# Patient Record
(demographics unavailable — no encounter records)

---

## 2017-03-20 NOTE — DIAGNOSTIC IMAGING REPORT
Indication: SOB



Technique: One view of the chest



Comparison: none



Findings: Lungs and pleural spaces are clear. Heart size is normal.



Impression: No acute process

## 2017-03-20 NOTE — EMERGENCY ROOM REPORT
History of Present Illness


General


Chief Complaint:  Skin Rash/Abscess


Source:  Patient





Present Illness


HPI


Patient is a 22-year-old female who presented after increased right arm 

swelling.  The patient gradual onset of symptoms.  Patient was type II 

diabetic.  She been taking insulin as well as metformin.  Patient was noted to 

have subjective fever.  She been having pain and swelling to the right axillary 

area as well as to the right side of her chest.  She denies recent trauma.


Allergies:  


Coded Allergies:  


     No Known Allergies (Unverified , 2/21/15)





Patient History


Past Medical History:  see triage record, DM


Last Menstrual Period:  3-9


Pregnant Now:  No


Reviewed Nursing Documentation:  PMH: Agreed, PSxH: Agreed





Nursing Documentation-PMH


Hx Diabetes:  Yes - type 1





Review of Systems


All Other Systems:  negative except mentioned in HPI





Physical Exam





Vital Signs








  Date Time  Temp Pulse Resp B/P Pulse Ox O2 Delivery O2 Flow Rate FiO2


 


3/20/17 10:28 98.8 106 18 100/59 98 Room Air  








Sp02 EP Interpretation:  reviewed, normal


General Appearance:  normal inspection, well appearing, alert, GCS 15, mild 

distress, obese


Head:  atraumatic


ENT:  normal ENT inspection, hearing grossly normal, normal voice


Neck:  normal inspection, full range of motion, supple, no bony tend


Respiratory:  normal inspection, lungs clear, normal breath sounds, no 

respiratory distress, no retraction, no wheezing


Cardiovascular #1:  regular rate, rhythm, no edema


Gastrointestinal:  normal inspection, normal bowel sounds, non tender, soft, no 

guarding, no hernia


Genitourinary:  no CVA tenderness


Musculoskeletal:  normal inspection, back normal, normal range of motion


Neurologic:  normal inspection, alert, oriented x3, responsive, CNs III-XII nml 

as tested, speech normal


Psychiatric:  normal inspection, judgement/insight normal, mood/affect normal


Skin:  no rash, other - erythema and without definite abscess to right axillary 

area





Medical Decision Making


Diagnostic Impression:  


 Primary Impression:  


 Cellulitis


ER Course


Patient presented for skin rash.


The differential diagnosis included was not limited to abscess, cellulitis, 

breast cancer, hidradenitis suppurativa among others. The patient was noted to 

have some erythema consistent with a cellulitis.  I don't feel any definite 

abscess.  Patient was given IV fluids as well as IV antibiotics.The patient was 

discussed with Dr. Reilly who agreed except patient in transfer.  Patient was 

noted to have initial lactic acid l which is likely due to patient's metformin 

use.  She does not appear to be septic.. Patient will likely need a ultrasound 

to definitively rule out abscess is an inpatient.





Last Vital Signs








  Date Time  Temp Pulse Resp B/P Pulse Ox O2 Delivery O2 Flow Rate FiO2


 


3/20/17 10:28 98.8 106 18 100/59 98 Room Air  








Status:  unchanged


Disposition:  XFER SHT-[facility]


Condition:  Serious


Referrals:  


[facility]ERR[facility] (PCP)











Xander Mayo Mar 20, 2017 10:57

## 2017-03-21 NOTE — CARDIOLOGY REPORT
--------------- APPROVED REPORT --------------





EKG Measurement

Heart [id number]

OR [id number]

[id number]

[id number]

[id number]





Sinus tachycardia

Otherwise normal ECG

## 2020-07-08 NOTE — EMERGENCY ROOM REPORT
History of Present Illness


General


Chief Complaint:  Skin Rash/Abscess


Source:  Patient





Present Illness


HPI


25-year-old female with history of diabetes presents with right axillary 

abscess for 1 week.  She reports moderate pain.  She has not taken any 

medications but she has applied warm water to the abscess.  She denies any fever

, chills, any other symptoms.


Allergies:  


Coded Allergies:  


     No Known Allergies (Unverified , 2/21/15)





COVID-19 Screening


Contact w/high risk pt:  No


Experienced COVID-19 symptoms?:  No


COVID-19 Testing performed PTA:  No





Patient History


Past Medical History:  see triage record


Last Menstrual Period:  07/02/20


Pregnant Now:  No


Reviewed Nursing Documentation:  PMH: Agreed; PSxH: Agreed





Nursing Documentation-PMH


Past Medical History:  No History, Except For


Hx Diabetes:  Yes - type 1





Review of Systems


Skin:  Reports: see HPI, other - abscess


All Other Systems:  negative except mentioned in HPI





Physical Exam





Vital Signs








  Date Time  Temp Pulse Resp B/P (MAP) Pulse Ox O2 Delivery O2 Flow Rate FiO2


 


7/8/20 12:45 98.8 87 16 115/79 (91) 96 Room Air  








Sp02 EP Interpretation:  reviewed, normal


General Appearance:  normal inspection, well appearing, no apparent distress, 

alert, GCS 15, non-toxic


Neck:  normal inspection, full range of motion, supple, thyroid normal, no 

meningismus, no bony tend


Respiratory:  chest non-tender, lungs clear, normal breath sounds, no 

respiratory distress


Cardiovascular #1:  normal peripheral pulses, regular rate, rhythm


Musculoskeletal:  normal inspection, normal range of motion, gait/station normal

, non-tender


Neurologic:  alert, oriented x3, sensory intact, speech normal


Psychiatric:  judgement/insight normal, mood/affect normal


Skin:  other


Lymphatic:  no adenopathy





Procedures


Incision and Drainage


Incision and Drainage :  


   Consent:  Verbal


   Site:  Right axilla


   Blade Size:  11


   I & D Procedure:  betadine prep, sterile dressing applied


   Wound Location:  axilla


   Wound's Depth, Shape:  superficial


   Wound Length (cm):  1


   Anesthesia:  1% Lidocaine


   Volume Anesthetic (ccs):  4


   Patient Tolerated:  Well


   Complications:  None





Medical Decision Making


PA Attestation


Dr. Neil is my supervising physician whom patient management and care has 

been discussed with.


Diagnostic Impression:  


 Primary Impression:  


 Abscess


ER Course


Pt. presents to the ED c/o abscess to the right axilla for 1 week





Ddx considered but are not limited to cellulitis, ingrown hair, bite





Vital signs: are WNL, pt. is afebrile


H&PE are most consistent with abscess





ORDERS: none required at this time, the diagnosis is clinical





ED INTERVENTIONS: Incision and drainage performed with no complications.





DISCHARGE: At this time pt. is stable for d/c to home. Will provide printed 

patient care instructions, and prescription for Bactrim. Advised to follow up 

outpatient in 1-2 days. Care plan and follow up instructions have been 

discussed with the patient prior to discharge.





Last Vital Signs








  Date Time  Temp Pulse Resp B/P (MAP) Pulse Ox O2 Delivery O2 Flow Rate FiO2


 


7/8/20 12:57 98.8  16 115/79 96 Room Air  


 


7/8/20 12:45  87      








Scripts


Trimethoprim/Sulfamethoxazole 160/800* (BACTRIM DS TABLET*) 1 Each Tablet


1 TAB ORAL TWICE A DAY for 10 Days, #20 TAB


   Prov: Adriana Riley [id number]         7/8/20


Referrals:  


[facility],REFERRING (PCP)











Adriana Riley Jul 8, 2020 13:23

## 2020-07-08 NOTE — NUR
ED Nurse Note:dry dressing was placed on right axilla opened abscess



Pt cleared by health care Provider for discharge.  DC instructions/prescription 
was given and explained to pt and verbalized understanding of teachings. All 
medical deviecs such as ID band  removed. Pt is AAO x4, ambulatory and left 
with all personal belongings.